# Patient Record
Sex: FEMALE | Race: WHITE | URBAN - METROPOLITAN AREA
[De-identification: names, ages, dates, MRNs, and addresses within clinical notes are randomized per-mention and may not be internally consistent; named-entity substitution may affect disease eponyms.]

---

## 2024-07-25 ENCOUNTER — OFFICE VISIT (OUTPATIENT)
Age: 29
End: 2024-07-25

## 2024-07-25 VITALS
BODY MASS INDEX: 38.97 KG/M2 | WEIGHT: 220 LBS | TEMPERATURE: 98.3 F | SYSTOLIC BLOOD PRESSURE: 138 MMHG | OXYGEN SATURATION: 97 % | HEART RATE: 131 BPM | DIASTOLIC BLOOD PRESSURE: 88 MMHG
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
## 2024-07-25 RX ORDER — NIFEDIPINE 30 MG/1
30 TABLET, EXTENDED RELEASE ORAL DAILY
COMMUNITY
Start: 2024-07-11

## 2024-07-25 RX ORDER — HUMAN INSULIN 100 [IU]/ML
20 INJECTION, SUSPENSION SUBCUTANEOUS
COMMUNITY
Start: 2024-02-07

## 2024-07-25 RX ORDER — METFORMIN HYDROCHLORIDE 500 MG/1
1000 TABLET, EXTENDED RELEASE ORAL 2 TIMES DAILY WITH MEALS
COMMUNITY
Start: 2024-02-07

## 2024-07-25 RX ORDER — AMOXICILLIN AND CLAVULANATE POTASSIUM 875; 125 MG/1; MG/1
1 TABLET, FILM COATED ORAL 2 TIMES DAILY
Qty: 10 TABLET | Refills: 0 | Status: SHIPPED | OUTPATIENT
Start: 2024-07-25 | End: 2024-07-30

## 2024-07-25 RX ORDER — INSULIN ASPART 100 [IU]/ML
INJECTION, SOLUTION INTRAVENOUS; SUBCUTANEOUS
COMMUNITY
Start: 2024-02-15

## 2024-07-26 ASSESSMENT — ENCOUNTER SYMPTOMS
NAUSEA: 0
VOMITING: 0
DIARRHEA: 0
SINUS PRESSURE: 0
CHEST TIGHTNESS: 0
SHORTNESS OF BREATH: 0
SORE THROAT: 0
COUGH: 0
ABDOMINAL PAIN: 0
CONSTIPATION: 0

## 2024-07-26 NOTE — PROGRESS NOTES
Anais Diana (:  1995) is a 28 y.o. female,New patient, here for evaluation of the following chief complaint(s):  Animal Bite (Cat bite today.)      ASSESSMENT/PLAN:    ICD-10-CM    1. Cat bite, initial encounter  W55.01XA amoxicillin-clavulanate (AUGMENTIN) 875-125 MG per tablet          Patient presents for a cat bite and cat scratch.   On exam no obvious signs of infection.   Abrasions were cleansed with saline and bacitracin ointment and nonadherant dressing applied.   Patient rx augmentin   Educated on signs of infection.     SUBJECTIVE/OBJECTIVE:    History provided by:  Patient   used: No      HPI:   28 y.o. female presents with symptoms of cat bite and scratch that occurred about an hour prior to arrival. Patient is about 38 weeks pregnant. She states that she did call her on call OB and she advised that patient come to urgent care and then they will see her tomorrow. She is a diabetic and has hypertension. She states that the cat is hers and cat is up-to-date on shots. She has not done anything to it.     Vitals:    24   BP: 138/88   Site: Left Upper Arm   Position: Sitting   Cuff Size: Medium Adult   Pulse: (!) 131   Temp: 98.3 °F (36.8 °C)   TempSrc: Oral   SpO2: 97%   Weight: 99.8 kg (220 lb)       Review of Systems   Constitutional:  Negative for fatigue and fever.   HENT:  Negative for congestion, sinus pressure and sore throat.    Respiratory:  Negative for cough, chest tightness and shortness of breath.    Cardiovascular:  Negative for chest pain.   Gastrointestinal:  Negative for abdominal pain, constipation, diarrhea, nausea and vomiting.   Musculoskeletal:  Negative for neck pain and neck stiffness.   Skin:  Positive for wound.       Physical Exam  Vitals and nursing note reviewed.   Constitutional:       Appearance: Normal appearance.   HENT:      Head: Normocephalic and atraumatic.   Eyes:      Extraocular Movements: Extraocular movements intact.     Unknown